# Patient Record
Sex: FEMALE | Race: WHITE | NOT HISPANIC OR LATINO | Employment: FULL TIME | URBAN - METROPOLITAN AREA
[De-identification: names, ages, dates, MRNs, and addresses within clinical notes are randomized per-mention and may not be internally consistent; named-entity substitution may affect disease eponyms.]

---

## 2024-06-03 ENCOUNTER — APPOINTMENT (OUTPATIENT)
Dept: GENERAL RADIOLOGY | Facility: HOSPITAL | Age: 19
DRG: 603 | End: 2024-06-03
Payer: OTHER GOVERNMENT

## 2024-06-03 ENCOUNTER — HOSPITAL ENCOUNTER (INPATIENT)
Facility: HOSPITAL | Age: 19
LOS: 1 days | Discharge: HOME | DRG: 603 | End: 2024-06-04
Attending: EMERGENCY MEDICINE | Admitting: INTERNAL MEDICINE
Payer: OTHER GOVERNMENT

## 2024-06-03 DIAGNOSIS — L03.116 CELLULITIS OF LEFT LOWER EXTREMITY: Primary | ICD-10-CM

## 2024-06-03 PROBLEM — L03.90 CELLULITIS: Status: ACTIVE | Noted: 2024-06-03

## 2024-06-03 LAB
ALBUMIN SERPL-MCNC: 4 G/DL (ref 3.5–5.2)
ALBUMIN/GLOB SERPL: 1.3 G/DL
ALP SERPL-CCNC: 82 U/L (ref 39–117)
ALT SERPL W P-5'-P-CCNC: 17 U/L (ref 1–33)
ANION GAP SERPL CALCULATED.3IONS-SCNC: 11.7 MMOL/L (ref 5–15)
AST SERPL-CCNC: 30 U/L (ref 1–32)
BASOPHILS # BLD AUTO: 0.04 10*3/MM3 (ref 0–0.2)
BASOPHILS NFR BLD AUTO: 0.8 % (ref 0–1.5)
BILIRUB SERPL-MCNC: 0.2 MG/DL (ref 0–1.2)
BUN SERPL-MCNC: 8 MG/DL (ref 6–20)
BUN/CREAT SERPL: 10.8 (ref 7–25)
CALCIUM SPEC-SCNC: 9.2 MG/DL (ref 8.6–10.5)
CHLORIDE SERPL-SCNC: 106 MMOL/L (ref 98–107)
CO2 SERPL-SCNC: 20.3 MMOL/L (ref 22–29)
CREAT SERPL-MCNC: 0.74 MG/DL (ref 0.57–1)
D-LACTATE SERPL-SCNC: 0.5 MMOL/L (ref 0.5–2)
D-LACTATE SERPL-SCNC: 3 MMOL/L (ref 0.5–2)
DEPRECATED RDW RBC AUTO: 41.3 FL (ref 37–54)
EGFRCR SERPLBLD CKD-EPI 2021: 119 ML/MIN/1.73
EOSINOPHIL # BLD AUTO: 0.24 10*3/MM3 (ref 0–0.4)
EOSINOPHIL NFR BLD AUTO: 4.7 % (ref 0.3–6.2)
ERYTHROCYTE [DISTWIDTH] IN BLOOD BY AUTOMATED COUNT: 13.1 % (ref 12.3–15.4)
GLOBULIN UR ELPH-MCNC: 3.1 GM/DL
GLUCOSE SERPL-MCNC: 101 MG/DL (ref 65–99)
HCT VFR BLD AUTO: 39.7 % (ref 34–46.6)
HGB BLD-MCNC: 12.8 G/DL (ref 12–15.9)
HOLD SPECIMEN: NORMAL
HOLD SPECIMEN: NORMAL
IMM GRANULOCYTES # BLD AUTO: 0.01 10*3/MM3 (ref 0–0.05)
IMM GRANULOCYTES NFR BLD AUTO: 0.2 % (ref 0–0.5)
LYMPHOCYTES # BLD AUTO: 2.04 10*3/MM3 (ref 0.7–3.1)
LYMPHOCYTES NFR BLD AUTO: 40.3 % (ref 19.6–45.3)
MCH RBC QN AUTO: 27.8 PG (ref 26.6–33)
MCHC RBC AUTO-ENTMCNC: 32.2 G/DL (ref 31.5–35.7)
MCV RBC AUTO: 86.3 FL (ref 79–97)
MONOCYTES # BLD AUTO: 0.66 10*3/MM3 (ref 0.1–0.9)
MONOCYTES NFR BLD AUTO: 13 % (ref 5–12)
MRSA DNA SPEC QL NAA+PROBE: NORMAL
NEUTROPHILS NFR BLD AUTO: 2.07 10*3/MM3 (ref 1.7–7)
NEUTROPHILS NFR BLD AUTO: 41 % (ref 42.7–76)
NRBC BLD AUTO-RTO: 0 /100 WBC (ref 0–0.2)
PLATELET # BLD AUTO: 213 10*3/MM3 (ref 140–450)
PMV BLD AUTO: 10.3 FL (ref 6–12)
POTASSIUM SERPL-SCNC: 4 MMOL/L (ref 3.5–5.2)
PROT SERPL-MCNC: 7.1 G/DL (ref 6–8.5)
RBC # BLD AUTO: 4.6 10*6/MM3 (ref 3.77–5.28)
SODIUM SERPL-SCNC: 138 MMOL/L (ref 136–145)
WBC NRBC COR # BLD AUTO: 5.06 10*3/MM3 (ref 3.4–10.8)
WHOLE BLOOD HOLD COAG: NORMAL
WHOLE BLOOD HOLD SPECIMEN: NORMAL

## 2024-06-03 PROCEDURE — 83605 ASSAY OF LACTIC ACID: CPT | Performed by: EMERGENCY MEDICINE

## 2024-06-03 PROCEDURE — 99285 EMERGENCY DEPT VISIT HI MDM: CPT

## 2024-06-03 PROCEDURE — G0378 HOSPITAL OBSERVATION PER HR: HCPCS

## 2024-06-03 PROCEDURE — 99222 1ST HOSP IP/OBS MODERATE 55: CPT | Performed by: INTERNAL MEDICINE

## 2024-06-03 PROCEDURE — 87641 MR-STAPH DNA AMP PROBE: CPT | Performed by: INTERNAL MEDICINE

## 2024-06-03 PROCEDURE — 25010000002 ENOXAPARIN PER 10 MG: Performed by: INTERNAL MEDICINE

## 2024-06-03 PROCEDURE — 25810000003 SODIUM CHLORIDE 0.9 % SOLUTION: Performed by: INTERNAL MEDICINE

## 2024-06-03 PROCEDURE — 80053 COMPREHEN METABOLIC PANEL: CPT | Performed by: EMERGENCY MEDICINE

## 2024-06-03 PROCEDURE — 36415 COLL VENOUS BLD VENIPUNCTURE: CPT | Performed by: EMERGENCY MEDICINE

## 2024-06-03 PROCEDURE — 85025 COMPLETE CBC W/AUTO DIFF WBC: CPT | Performed by: EMERGENCY MEDICINE

## 2024-06-03 PROCEDURE — 87040 BLOOD CULTURE FOR BACTERIA: CPT | Performed by: EMERGENCY MEDICINE

## 2024-06-03 PROCEDURE — 73620 X-RAY EXAM OF FOOT: CPT

## 2024-06-03 PROCEDURE — 25010000002 VANCOMYCIN 5 G RECONSTITUTED SOLUTION: Performed by: INTERNAL MEDICINE

## 2024-06-03 RX ORDER — AMOXICILLIN 250 MG
2 CAPSULE ORAL 2 TIMES DAILY PRN
Status: DISCONTINUED | OUTPATIENT
Start: 2024-06-03 | End: 2024-06-04 | Stop reason: HOSPADM

## 2024-06-03 RX ORDER — ACETAMINOPHEN 160 MG/5ML
650 SOLUTION ORAL EVERY 4 HOURS PRN
Status: DISCONTINUED | OUTPATIENT
Start: 2024-06-03 | End: 2024-06-04 | Stop reason: HOSPADM

## 2024-06-03 RX ORDER — SODIUM CHLORIDE 0.9 % (FLUSH) 0.9 %
10 SYRINGE (ML) INJECTION EVERY 12 HOURS SCHEDULED
Status: DISCONTINUED | OUTPATIENT
Start: 2024-06-03 | End: 2024-06-04 | Stop reason: HOSPADM

## 2024-06-03 RX ORDER — SODIUM CHLORIDE 9 MG/ML
40 INJECTION, SOLUTION INTRAVENOUS AS NEEDED
Status: DISCONTINUED | OUTPATIENT
Start: 2024-06-03 | End: 2024-06-04 | Stop reason: HOSPADM

## 2024-06-03 RX ORDER — POLYETHYLENE GLYCOL 3350 17 G/17G
17 POWDER, FOR SOLUTION ORAL DAILY PRN
Status: DISCONTINUED | OUTPATIENT
Start: 2024-06-03 | End: 2024-06-04 | Stop reason: HOSPADM

## 2024-06-03 RX ORDER — ENOXAPARIN SODIUM 100 MG/ML
40 INJECTION SUBCUTANEOUS DAILY
Status: DISCONTINUED | OUTPATIENT
Start: 2024-06-03 | End: 2024-06-04 | Stop reason: HOSPADM

## 2024-06-03 RX ORDER — ACETAMINOPHEN 650 MG/1
650 SUPPOSITORY RECTAL EVERY 4 HOURS PRN
Status: DISCONTINUED | OUTPATIENT
Start: 2024-06-03 | End: 2024-06-04 | Stop reason: HOSPADM

## 2024-06-03 RX ORDER — ACETAMINOPHEN 325 MG/1
650 TABLET ORAL EVERY 4 HOURS PRN
Status: DISCONTINUED | OUTPATIENT
Start: 2024-06-03 | End: 2024-06-04 | Stop reason: HOSPADM

## 2024-06-03 RX ORDER — ONDANSETRON 2 MG/ML
4 INJECTION INTRAMUSCULAR; INTRAVENOUS EVERY 6 HOURS PRN
Status: DISCONTINUED | OUTPATIENT
Start: 2024-06-03 | End: 2024-06-04 | Stop reason: HOSPADM

## 2024-06-03 RX ORDER — SODIUM CHLORIDE 0.9 % (FLUSH) 0.9 %
10 SYRINGE (ML) INJECTION AS NEEDED
Status: DISCONTINUED | OUTPATIENT
Start: 2024-06-03 | End: 2024-06-04 | Stop reason: HOSPADM

## 2024-06-03 RX ORDER — BISACODYL 5 MG/1
5 TABLET, DELAYED RELEASE ORAL DAILY PRN
Status: DISCONTINUED | OUTPATIENT
Start: 2024-06-03 | End: 2024-06-04 | Stop reason: HOSPADM

## 2024-06-03 RX ORDER — TRAMADOL HYDROCHLORIDE 50 MG/1
50 TABLET ORAL EVERY 6 HOURS PRN
Status: DISCONTINUED | OUTPATIENT
Start: 2024-06-03 | End: 2024-06-04 | Stop reason: HOSPADM

## 2024-06-03 RX ORDER — BISACODYL 10 MG
10 SUPPOSITORY, RECTAL RECTAL DAILY PRN
Status: DISCONTINUED | OUTPATIENT
Start: 2024-06-03 | End: 2024-06-04 | Stop reason: HOSPADM

## 2024-06-03 RX ORDER — CLINDAMYCIN HYDROCHLORIDE 300 MG/1
300 CAPSULE ORAL 3 TIMES DAILY
COMMUNITY
End: 2024-06-04 | Stop reason: HOSPADM

## 2024-06-03 RX ADMIN — VANCOMYCIN HYDROCHLORIDE 1250 MG: 5 INJECTION, POWDER, LYOPHILIZED, FOR SOLUTION INTRAVENOUS at 19:34

## 2024-06-03 RX ADMIN — Medication 10 ML: at 21:11

## 2024-06-03 RX ADMIN — ENOXAPARIN SODIUM 40 MG: 100 INJECTION SUBCUTANEOUS at 19:37

## 2024-06-03 NOTE — H&P
[facility]  Date: 6/3/2024   Patient Name: Michelle Loving  : 2005  MRN: [id number]  Primary Care Physician:  Provider, No Known  Date of admission: 6/3/2024    Subjective   Subjective     Chief Complaint: Left foot swelling and erythema    HPI:    Michelle Loving is a 18 y.o. female no past medical history that presents with left foot swelling and erythema x 4 days.  She was seen in outpatient setting 2 days ago and prescribed clindamycin but symptoms have progressed since that time therefore brought to the ER for further evaluation.  Denies any other fevers, chills, sweats, nausea, vomiting, chest pain, shortness of breath, palpitations, abdominal pain diarrhea constipation dysuria, weakness.  This patient has failed outpatient therapy with clindamycin she has been started on vancomycin and will be admitted for ongoing monitoring and management.      Personal History     Past Medical History:  Denies    Past Surgical History:  Denies    Family History:   Denies any immediate family history of heart disease, cancer    Social History:   No alcohol tobacco or illicit drug use, independent ADLs    Home Medications:  clindamycin    Allergies:  Allergies   Allergen Reactions    Amoxicillin Hives       Review of Systems   All systems were reviewed and negative except for: Left foot swelling and erythema and pain    Objective   Objective     Vitals:   Temp:  [98.8 °F (37.1 °C)] 98.8 °F (37.1 °C)  Heart Rate:  [] 73  Resp:  [18] 18  BP: (107-137)/(66-85) 111/72    Physical Exam    Constitutional: Awake, alert, no acute distress   Eyes: Pupils equal, sclerae anicteric, no conjunctival injection   HENT: NCAT, mucous membranes moist   Neck: Supple, no thyromegaly, no lymphadenopathy, trachea midline   Respiratory: Clear to auscultation bilaterally, nonlabored respirations    Cardiovascular: RRR, no murmurs, rubs, or gallops, palpable pedal pulses  bilaterally   Gastrointestinal: Positive bowel sounds, soft, nontender, nondistended   Musculoskeletal: No bilateral ankle edema, no clubbing or cyanosis to extremities   Psychiatric: Appropriate affect, cooperative   Neurologic: Oriented x 3, strength symmetric in all extremities, Cranial Nerves grossly intact to confrontation, speech clear   Skin: Left foot swelling and erythema    Result Review    Result Review:  I have personally reviewed the results from the time of this admission to 6/3/2024 17:18 EDT and agree with these findings:  [x]  Laboratory  []  Microbiology  [x]  Radiology  []  EKG/Telemetry   []  Cardiology/Vascular   []  Pathology  []  Old records  []  Other:      Assessment & Plan   Assessment / Plan     Assessment/Plan:   Left foot cellulitis: Failed outpatient therapy with clindamycin.  Will start vancomycin and monitor for improvement.  Can likely DC on Keflex or Bactrim once improving.  Serial exams.  Follow blood cultures.      DVT prophylaxis:  Medical DVT prophylaxis orders are signed and held.          CODE STATUS:     Full code    Admission Status:  I believe this patient meets observation status.    Electronically signed by Bi Reid Jr, MD, 06/03/24, 5:18 PM EDT.

## 2024-06-03 NOTE — ED TRIAGE NOTES
LLE pain/discoloration/swelling x 4 days.  Pt went to UC on Saturday for redness and dx with celulitis and given clindamycin with no improvement. Pt was waiting [place] Wednesday but reports that she doesn't think there was an injury to the area.

## 2024-06-03 NOTE — PROGRESS NOTES
"[facility] Clinical Pharmacy Services: Vancomycin Pharmacokinetic Initial Consult Note    Michelle Loving is a 18 y.o. female who is on day 1 of pharmacy to dose vancomycin for Skin and Soft Tissue.    Consult Information  Consulting Provider: PAWAN Reid  Planned Duration of Therapy: 10 days  Was Patient Receiving Prior to Admission/Consult?: No  Loading Dose Ordered or Given: None  PK/PD Target: -600 mg/L.hr   Relevant ID History: Failed outpatient clindamycin per H&P  Other Antimicrobials: None    Imaging Reviewed?: Yes    Microbiology Data  MRSA PCR performed: 24; Result: Pending  Culture/Source:   6/3: Blood - Pending   6/3: Blood - Pending    Vitals/Labs  Ht: 160 cm (63\"); Wt: 68.1 kg (150 lb 2.1 oz)  Temp (24hrs), Av.5 °F (36.9 °C), Min:97.8 °F (36.6 °C), Max:98.8 °F (37.1 °C)   Estimated Creatinine Clearance: 112.4 mL/min (by C-G formula based on SCr of 0.74 mg/dL).       Results from last 7 days   Lab Units 24  1216   CREATININE mg/dL 0.74   WBC 10*3/mm3 5.06     Assessment/Plan:    Vancomycin Dose:  1250 mg IV every 12 hours; which provides the following predicted parameters:  AUC24,ss: 515 mg/L.hr  PAUC*: 75 %  Ctrough,ss: 14.6 mg/L  Pconc*: 27 %  Tox.: 10 %  Vanc Random planned for  at 0600  Patient has order for Basic Metabolic Panel    Pharmacy will follow patient's kidney function and will adjust doses and obtain levels as necessary. Thank you for involving pharmacy in this patient's care. Please contact pharmacy with any questions or concerns.                           Maribell Patino  Clinical Pharmacist    "

## 2024-06-03 NOTE — ED PROVIDER NOTES
"Time: 2:56 PM EDT  Date of encounter:  6/3/2024  Independent Historian/Clinical History and Information was obtained by:   Patient    History is limited by: N/A    Chief Complaint: Left foot infection      History of Present Illness:  Patient is a 18 y.o. year old female who presents to the emergency department for evaluation of left foot infection.  Patient is had cellulitis of the left foot toes and has been on clindamycin and since Saturday after being seen in urgent care.  Patient states redness is spreading.    HPI    Patient Care Team  Primary Care Provider: Provider, No Known    Past Medical History:     Allergies   Allergen Reactions    Amoxicillin Hives     Past Medical History:   Diagnosis Date    Staph infection      History reviewed. No pertinent surgical history.  History reviewed. No pertinent family history.    Home Medications:  Prior to Admission medications    Medication Sig Start Date End Date Taking? Authorizing Provider   clindamycin (CLEOCIN) 300 MG capsule Take 1 capsule by mouth 3 (Three) Times a Day.    Provider, Historical, MD        Social History:   Social History     Tobacco Use    Smoking status: Never     Passive exposure: Never    Smokeless tobacco: Never   Vaping Use    Vaping status: Never Used   Substance Use Topics    Alcohol use: Never    Drug use: Never         Review of Systems:  Review of Systems   Skin:  Positive for color change.        Physical Exam:  /67 (BP Location: Left arm, Patient Position: Lying)   Pulse 75   Temp 97.5 °F (36.4 °C) (Oral)   Resp 18   Ht 160 cm (63\")   Wt 71 kg (156 lb 8.4 oz)   SpO2 100%   BMI 27.73 kg/m²     Physical Exam  Vitals and nursing note reviewed.   Constitutional:       General: She is not in acute distress.  Eyes:      Extraocular Movements: Extraocular movements intact.   Cardiovascular:      Rate and Rhythm: Normal rate.   Pulmonary:      Effort: Pulmonary effort is normal. No respiratory distress.   Musculoskeletal:        "  General: Normal range of motion.   Skin:     General: Skin is warm and dry.      Findings: Erythema present.      Comments: Patient has erythema, swelling and tenderness with warmth to the left toes and distal forefoot consistent with cellulitis.   Neurological:      Mental Status: She is alert and oriented to person, place, and time.                  Procedures:  Procedures      Medical Decision Making:      Comorbidities that affect care:    None    External Notes reviewed:  None available        The following orders were placed and all results were independently analyzed by me:  Orders Placed This Encounter   Procedures    Blood Culture - Blood,    Blood Culture - Blood,    MRSA Screen, PCR (Inpatient) - Swab, Nares    XR Foot 2 View Left    Comprehensive Metabolic Panel    Lactic Acid, Plasma    [place] Draw    CBC Auto Differential    STAT Lactic Acid, Reflex    CBC (No Diff)    Comprehensive Metabolic Panel    Initiate Observation Status    Inpatient Admission    Discharge patient    CBC & Differential    Green Top (Gel)    Lavender Top    Gold Top - SST    Light Blue Top       Medications Given in the Emergency Department:  Medications - No data to display     ED Course:         Labs:    Lab Results (last 24 hours)       ** No results found for the last 24 hours. **             Imaging:    No Radiology Exams Resulted Within Past 24 Hours      Differential Diagnosis and Discussion:    Extremity Pain: Differential diagnosis includes but is not limited to soft tissue sprain, tendonitis, tendon injury, dislocation, fracture, deep vein thrombosis, arterial insufficiency, osteoarthritis, bursitis, and ligamentous damage.    All labs were reviewed and interpreted by me.  All X-rays impressions were independently interpreted by me.    MDM  The patient has erythema and pain consistent with cellulitis. The area of erythema was demarcated in the ED. The patient has no fluctuance or induration consistent with abscess  formation. The patient was started on antibiotics in the Emergency Department. The patient is resting comfortably and feels better, is alert and in no distress. On re-examination the patient does not appear toxic and has no meningeal signs, and there's no intractable vomiting or respiratory distress. Based on the history, exam, diagnostic testing and reassessment, the patient has no signs of sepsis.  The patient's vital signs have been stable. The patient's condition is stable and is appropriate for discharge. The patient will pursue further outpatient evaluation with the primary care physician or other designated or consultant physician as indicated in the discharge instructions. The patient was counseled that a repeat examination within two days is imperative. This can be done as an outpatient. Patient advised to return to the ED if outpatient evaluation is not feasible. The patient was counseled to return to the ER sooner for worsening of erythema or spread outside of the demarcated lines. The patient was also counseled to return for worsening fever, chills, altered mental status, intractable vomiting or any other symptoms of concern.        Patient Care Considerations:          Consultants/Shared Management Plan:    None    Social Determinants of Health:    Patient is independent, reliable, and has access to care.       Disposition and Care Coordination:    Discharged: The patient is suitable and stable for discharge with no need for consideration of admission.    I have explained the patient´s condition, diagnoses and treatment plan based on the information available to me at this time. I have answered questions and addressed any concerns. The patient has a good  understanding of the patient´s diagnosis, condition, and treatment plan as can be expected at this point. The vital signs have been stable. The patient´s condition is stable and appropriate for discharge from the emergency department.      The patient  will pursue further outpatient evaluation with the primary care physician or other designated or consulting physician as outlined in the discharge instructions. They are agreeable to this plan of care and follow-up instructions have been explained in detail. The patient has received these instructions in written format and has expressed an understanding of the discharge instructions. The patient is aware that any significant change in condition or worsening of symptoms should prompt an immediate return to this or the closest emergency department or call to 911.  I have explained discharge medications and the need for follow up with the patient/caretakers. This was also printed in the discharge instructions. Patient was discharged with the following medications and follow up:      Medication List        New Prescriptions      cephalexin 500 MG capsule  Commonly known as: KEFLEX  Take 1 capsule by mouth Every 6 (Six) Hours for 20 doses. Indications: Infection of the Skin and/or Soft Tissue     doxycycline 100 MG capsule  Commonly known as: MONODOX  Take 1 capsule by mouth Every 12 (Twelve) Hours for 10 doses. Indications: Infection of the Skin and/or Soft Tissue            Stop      clindamycin 300 MG capsule  Commonly known as: CLEOCIN               Where to Get Your Medications        These medications were sent to [facility] [id number] - [place], [place] - [address] [facility] - [phone]  - [phone]  [address] [facility] [address]      Phone: [phone]   cephalexin 500 MG capsule  doxycycline 100 MG capsule      Provider, No Known  [facility]  [place] [address]      Please make a follow up appointment wiht your primary care provider 3-5 days.      Final diagnoses:   Cellulitis of left lower extremity        ED Disposition       ED Disposition   Decision to Admit    Condition   --    Comment   Level of Care: Med/Surg [1]   Diagnosis: Cellulitis [[id number]]                  This medical record created using voice recognition software.             Eldon Adkins DO  06/08/24 2102

## 2024-06-04 ENCOUNTER — READMISSION MANAGEMENT (OUTPATIENT)
Dept: CALL CENTER | Facility: HOSPITAL | Age: 19
End: 2024-06-04
Payer: OTHER GOVERNMENT

## 2024-06-04 VITALS
WEIGHT: 156.53 LBS | DIASTOLIC BLOOD PRESSURE: 67 MMHG | HEART RATE: 75 BPM | HEIGHT: 63 IN | TEMPERATURE: 97.5 F | RESPIRATION RATE: 18 BRPM | OXYGEN SATURATION: 100 % | BODY MASS INDEX: 27.73 KG/M2 | SYSTOLIC BLOOD PRESSURE: 116 MMHG

## 2024-06-04 LAB
ALBUMIN SERPL-MCNC: 3.7 G/DL (ref 3.5–5.2)
ALBUMIN/GLOB SERPL: 1.3 G/DL
ALP SERPL-CCNC: 72 U/L (ref 39–117)
ALT SERPL W P-5'-P-CCNC: 14 U/L (ref 1–33)
ANION GAP SERPL CALCULATED.3IONS-SCNC: 10.4 MMOL/L (ref 5–15)
AST SERPL-CCNC: 22 U/L (ref 1–32)
BILIRUB SERPL-MCNC: <0.2 MG/DL (ref 0–1.2)
BUN SERPL-MCNC: 11 MG/DL (ref 6–20)
BUN/CREAT SERPL: 16.2 (ref 7–25)
CALCIUM SPEC-SCNC: 8.9 MG/DL (ref 8.6–10.5)
CHLORIDE SERPL-SCNC: 104 MMOL/L (ref 98–107)
CO2 SERPL-SCNC: 24.6 MMOL/L (ref 22–29)
CREAT SERPL-MCNC: 0.68 MG/DL (ref 0.57–1)
DEPRECATED RDW RBC AUTO: 40.4 FL (ref 37–54)
EGFRCR SERPLBLD CKD-EPI 2021: 128 ML/MIN/1.73
ERYTHROCYTE [DISTWIDTH] IN BLOOD BY AUTOMATED COUNT: 13.1 % (ref 12.3–15.4)
GLOBULIN UR ELPH-MCNC: 2.8 GM/DL
GLUCOSE SERPL-MCNC: 107 MG/DL (ref 65–99)
HCT VFR BLD AUTO: 37.7 % (ref 34–46.6)
HGB BLD-MCNC: 12.4 G/DL (ref 12–15.9)
MCH RBC QN AUTO: 28 PG (ref 26.6–33)
MCHC RBC AUTO-ENTMCNC: 32.9 G/DL (ref 31.5–35.7)
MCV RBC AUTO: 85.1 FL (ref 79–97)
PLATELET # BLD AUTO: 217 10*3/MM3 (ref 140–450)
PMV BLD AUTO: 10.7 FL (ref 6–12)
POTASSIUM SERPL-SCNC: 4.3 MMOL/L (ref 3.5–5.2)
PROT SERPL-MCNC: 6.5 G/DL (ref 6–8.5)
RBC # BLD AUTO: 4.43 10*6/MM3 (ref 3.77–5.28)
SODIUM SERPL-SCNC: 139 MMOL/L (ref 136–145)
WBC NRBC COR # BLD AUTO: 7.75 10*3/MM3 (ref 3.4–10.8)

## 2024-06-04 PROCEDURE — 80053 COMPREHEN METABOLIC PANEL: CPT | Performed by: INTERNAL MEDICINE

## 2024-06-04 PROCEDURE — 25010000002 VANCOMYCIN 5 G RECONSTITUTED SOLUTION: Performed by: STUDENT IN AN ORGANIZED HEALTH CARE EDUCATION/TRAINING PROGRAM

## 2024-06-04 PROCEDURE — 85027 COMPLETE CBC AUTOMATED: CPT | Performed by: INTERNAL MEDICINE

## 2024-06-04 PROCEDURE — 25010000002 ENOXAPARIN PER 10 MG: Performed by: INTERNAL MEDICINE

## 2024-06-04 PROCEDURE — 99239 HOSP IP/OBS DSCHRG MGMT >30: CPT | Performed by: STUDENT IN AN ORGANIZED HEALTH CARE EDUCATION/TRAINING PROGRAM

## 2024-06-04 PROCEDURE — 25810000003 SODIUM CHLORIDE 0.9 % SOLUTION: Performed by: STUDENT IN AN ORGANIZED HEALTH CARE EDUCATION/TRAINING PROGRAM

## 2024-06-04 RX ORDER — DOXYCYCLINE 100 MG/1
100 CAPSULE ORAL EVERY 12 HOURS SCHEDULED
Status: DISCONTINUED | OUTPATIENT
Start: 2024-06-04 | End: 2024-06-04 | Stop reason: HOSPADM

## 2024-06-04 RX ORDER — CEPHALEXIN 500 MG/1
500 CAPSULE ORAL EVERY 6 HOURS SCHEDULED
Qty: 20 CAPSULE | Refills: 0 | Status: SHIPPED | OUTPATIENT
Start: 2024-06-04 | End: 2024-06-09

## 2024-06-04 RX ORDER — CEPHALEXIN 500 MG/1
500 CAPSULE ORAL EVERY 6 HOURS SCHEDULED
Status: DISCONTINUED | OUTPATIENT
Start: 2024-06-04 | End: 2024-06-04 | Stop reason: HOSPADM

## 2024-06-04 RX ORDER — DOXYCYCLINE 100 MG/1
100 CAPSULE ORAL EVERY 12 HOURS SCHEDULED
Qty: 10 CAPSULE | Refills: 0 | Status: SHIPPED | OUTPATIENT
Start: 2024-06-04 | End: 2024-06-09

## 2024-06-04 RX ADMIN — CEPHALEXIN 500 MG: 500 CAPSULE ORAL at 14:29

## 2024-06-04 RX ADMIN — DOXYCYCLINE 100 MG: 100 CAPSULE ORAL at 14:29

## 2024-06-04 RX ADMIN — VANCOMYCIN HYDROCHLORIDE 1250 MG: 5 INJECTION, POWDER, LYOPHILIZED, FOR SOLUTION INTRAVENOUS at 10:04

## 2024-06-04 RX ADMIN — Medication 10 ML: at 10:04

## 2024-06-04 RX ADMIN — ENOXAPARIN SODIUM 40 MG: 100 INJECTION SUBCUTANEOUS at 10:04

## 2024-06-04 NOTE — PAYOR COMM NOTE
"Shakira Loving (18 y.o. Female)       Date of Birth   2005    Social Security Number       Address   [address] [place] [address]    Home Phone   [phone]    MRN   [id number]       Congregational   None    Marital Status   Single                            Admission Date   6/3/24    Admission Type   Emergency    Admitting Provider   Bi Reid Jr., MD    Attending Provider   Sindi Hernandez DO    Department, Room/Bed   [address] AMBULATORY SERVICES, 366/       Discharge Date       Discharge Disposition       Discharge Destination                                 Attending Provider: Sindi Hernandez DO    Allergies: Amoxicillin    Isolation: None   Infection: None   Code Status: CPR    Ht: 160 cm (63\")   Wt: 71 kg (156 lb 8.4 oz)    Admission Cmt: None   Principal Problem: Cellulitis [L03.90]                   Active Insurance as of 6/3/2024       Primary Coverage       Payor Plan Insurance Group Employer/Plan Group    [facility]        Payor Plan Address Payor Plan Phone Number Payor Plan Fax Number Effective Dates    [address] [phone]  6/3/2024 - None Entered    [facility] [id number]         Subscriber Name Subscriber Birth Date Member ID       SHAKIRA LOVING 2005 [id number]                     Emergency Contacts            No emergency contacts on file.                 History & Physical        Bi Reid Jr., MD at [address] [place] [address] HISTORY AND PHYSICAL  Date: 6/3/2024   Patient Name: Shakira Loving  : 2005  MRN: [id number]  Primary Care Physician:  Provider, No Known  Date of admission: 6/3/2024    Subjective  Subjective     Chief Complaint: Left foot swelling and erythema    HPI:    Shakira Loving is a 18 y.o. female no past medical history that presents with left foot swelling and erythema x 4 days.  She was seen in outpatient setting 2 days ago and prescribed " clindamycin but symptoms have progressed since that time therefore brought to the ER for further evaluation.  Denies any other fevers, chills, sweats, nausea, vomiting, chest pain, shortness of breath, palpitations, abdominal pain diarrhea constipation dysuria, weakness.  This patient has failed outpatient therapy with clindamycin she has been started on vancomycin and will be admitted for ongoing monitoring and management.      Personal History     Past Medical History:  Denies    Past Surgical History:  Denies    Family History:   Denies any immediate family history of heart disease, cancer    Social History:   No alcohol tobacco or illicit drug use, independent ADLs    Home Medications:  clindamycin    Allergies:  Allergies   Allergen Reactions    Amoxicillin Hives       Review of Systems   All systems were reviewed and negative except for: Left foot swelling and erythema and pain    Objective  Objective     Vitals:   Temp:  [98.8 °F (37.1 °C)] 98.8 °F (37.1 °C)  Heart Rate:  [] 73  Resp:  [18] 18  BP: (107-137)/(66-85) 111/72    Physical Exam    Constitutional: Awake, alert, no acute distress   Eyes: Pupils equal, sclerae anicteric, no conjunctival injection   HENT: NCAT, mucous membranes moist   Neck: Supple, no thyromegaly, no lymphadenopathy, trachea midline   Respiratory: Clear to auscultation bilaterally, nonlabored respirations    Cardiovascular: RRR, no murmurs, rubs, or gallops, palpable pedal pulses bilaterally   Gastrointestinal: Positive bowel sounds, soft, nontender, nondistended   Musculoskeletal: No bilateral ankle edema, no clubbing or cyanosis to extremities   Psychiatric: Appropriate affect, cooperative   Neurologic: Oriented x 3, strength symmetric in all extremities, Cranial Nerves grossly intact to confrontation, speech clear   Skin: Left foot swelling and erythema    Result Review   Result Review:  I have personally reviewed the results from the time of this admission to 6/3/2024 17:18  EDT and agree with these findings:  [x]  Laboratory  []  Microbiology  [x]  Radiology  []  EKG/Telemetry   []  Cardiology/Vascular   []  Pathology  []  Old records  []  Other:      Assessment & Plan  Assessment / Plan     Assessment/Plan:   Left foot cellulitis: Failed outpatient therapy with clindamycin.  Will start vancomycin and monitor for improvement.  Can likely DC on Keflex or Bactrim once improving.  Serial exams.  Follow blood cultures.      DVT prophylaxis:  Medical DVT prophylaxis orders are signed and held.          CODE STATUS:     Full code    Admission Status:  I believe this patient meets observation status.    Electronically signed by Bi Reid Jr, MD, 06/03/24, 5:18 PM EDT.             Electronically signed by Bi Reid Jr., MD at 06/03/24 1719          Emergency Department Notes        Bhumi Dotson, RN at 06/03/24 1207          LLE pain/discoloration/swelling x 4 days.  Pt went to  on Saturday for redness and dx with celulitis and given clindamycin with no improvement. Pt was [place] but reports that she doesn't think there was an injury to the area.     Electronically signed by Bhumi Dotson, RN at 06/03/24 1209       Facility-Administered Medications as of 6/4/2024   Medication Dose Route Frequency Provider Last Rate Last Admin    acetaminophen (TYLENOL) tablet 650 mg  650 mg Oral Q4H PRN Bi Reid Jr., MD        Or    acetaminophen (TYLENOL) 160 MG/5ML oral solution 650 mg  650 mg Oral Q4H PRN Bi Reid Jr., MD        Or    acetaminophen (TYLENOL) suppository 650 mg  650 mg Rectal Q4H PRN Bi Reid Jr., MD        sennosides-docusate (PERICOLACE) 8.6-50 MG per tablet 2 tablet  2 tablet Oral BID PRN Bi Reid Jr., MD        And    polyethylene glycol (MIRALAX) packet 17 g  17 g Oral Daily PRN Bi Reid Jr., MD        And    bisacodyl (DULCOLAX) EC tablet 5 mg  5 mg Oral Daily PRN Bi Reid Jr., MD        And    bisacodyl  (DULCOLAX) suppository 10 mg  10 mg Rectal Daily PRN Bi Reid Jr., MD        Enoxaparin Sodium (LOVENOX) syringe 40 mg  40 mg Subcutaneous Daily Bi Reid Jr., MD   40 mg at 06/04/24 1004    ondansetron (ZOFRAN) injection 4 mg  4 mg Intravenous Q6H PRN Bi Reid Jr., MD        Pharmacy to dose vancomycin   Does not apply Continuous PRN Bi Reid Jr., MD        sodium chloride 0.9 % flush 10 mL  10 mL Intravenous Q12H Bi Reid Jr., MD   10 mL at 06/04/24 1004    sodium chloride 0.9 % flush 10 mL  10 mL Intravenous PRN Bi Reid Jr., MD        sodium chloride 0.9 % infusion 40 mL  40 mL Intravenous PRN Bi Reid Jr., MD        traMADol (ULTRAM) tablet 50 mg  50 mg Oral Q6H PRN Bi Reid Jr., MD        vancomycin 1250 mg/250 mL 0.9% NS IVPB ([facility])  1,250 mg Intravenous Q12H Sindi Hernandez DO   1,250 mg at 06/04/24 1004       Lab Results (last 24 hours)       Procedure Component Value Units Date/Time    Comprehensive Metabolic Panel [[id number]]  (Abnormal) Collected: 06/04/24 0518    Specimen: Blood Updated: 06/04/24 0621     Glucose 107 mg/dL      BUN 11 mg/dL      Creatinine 0.68 mg/dL      Sodium 139 mmol/L      Potassium 4.3 mmol/L      Chloride 104 mmol/L      CO2 24.6 mmol/L      Calcium 8.9 mg/dL      Total Protein 6.5 g/dL      Albumin 3.7 g/dL      ALT (SGPT) 14 U/L      AST (SGOT) 22 U/L      Alkaline Phosphatase 72 U/L      Total Bilirubin <0.2 mg/dL      Globulin 2.8 gm/dL      A/G Ratio 1.3 g/dL      BUN/Creatinine Ratio 16.2     Anion Gap 10.4 mmol/L      eGFR 128.0 mL/min/1.73     Narrative:      GFR Normal >60  Chronic Kidney Disease <60  Kidney Failure <15      CBC (No Diff) [[id number]]  (Normal) Collected: 06/04/24 0518    Specimen: Blood Updated: 06/04/24 0559     WBC 7.75 10*3/mm3      RBC 4.43 10*6/mm3      Hemoglobin 12.4 g/dL      Hematocrit 37.7 %      MCV 85.1 fL      MCH 28.0 pg      MCHC 32.9 g/dL      RDW 13.1 %      RDW-SD 40.4 fl       MPV 10.7 fL      Platelets 217 10*3/mm3     MRSA Screen, PCR (Inpatient) - Swab, Nares [[id number]]  (Normal) Collected: 06/03/24 1917    Specimen: Swab from Nares Updated: 06/03/24 2051     MRSA PCR No MRSA Detected    Narrative:      The negative predictive value of this diagnostic test is high and should only be used to consider de-escalating anti-MRSA therapy. A positive result may indicate colonization with MRSA and must be correlated clinically.    STAT Lactic Acid, Reflex [[id number]]  (Normal) Collected: 06/03/24 1511    Specimen: Blood from Arm, Left Updated: 06/03/24 1531     Lactate 0.5 mmol/L     Blood Culture - Blood, Arm, Left [[id number]] Collected: 06/03/24 1305    Specimen: Blood from Arm, Left Updated: 06/03/24 1310    Lactic Acid, Plasma [[id number]]  (Abnormal) Collected: 06/03/24 1216    Specimen: Blood from Arm, Left Updated: 06/03/24 1256     Lactate 3.0 mmol/L     Comprehensive Metabolic Panel [[id number]]  (Abnormal) Collected: 06/03/24 1216    Specimen: Blood from Arm, Left Updated: 06/03/24 1245     Glucose 101 mg/dL      BUN 8 mg/dL      Creatinine 0.74 mg/dL      Sodium 138 mmol/L      Potassium 4.0 mmol/L      Chloride 106 mmol/L      CO2 20.3 mmol/L      Calcium 9.2 mg/dL      Total Protein 7.1 g/dL      Albumin 4.0 g/dL      ALT (SGPT) 17 U/L      AST (SGOT) 30 U/L      Alkaline Phosphatase 82 U/L      Total Bilirubin 0.2 mg/dL      Globulin 3.1 gm/dL      A/G Ratio 1.3 g/dL      BUN/Creatinine Ratio 10.8     Anion Gap 11.7 mmol/L      eGFR 119.0 mL/min/1.73     Narrative:      GFR Normal >60  Chronic Kidney Disease <60  Kidney Failure <15      [place] Draw [[id number]] Collected: 06/03/24 1216    Specimen: Blood from Arm, Left Updated: 06/03/24 1228    Narrative:      The following orders were created for panel order [place] Draw.  Procedure                               Abnormality         Status                     ---------                               -----------         ------                      Green Top (Gel)[[id number]]                                  Final result               Lavender Top[[id number]]                                     Final result               Gold Top - SST[[id number]]                                   Final result               Light Blue Top[[id number]]                                   Final result                 Please view results for these tests on the individual orders.    Light Blue Top [[id number]] Collected: 06/03/24 1216    Specimen: Blood from Arm, Left Updated: 06/03/24 1228     Extra Tube Hold for add-ons.     Comment: Auto resulted       Gold Top - SST [[id number]] Collected: 06/03/24 1216    Specimen: Blood from Arm, Left Updated: 06/03/24 1228     Extra Tube Hold for add-ons.     Comment: Auto resulted.       Green Top (Gel) [[id number]] Collected: 06/03/24 1216    Specimen: Blood from Arm, Left Updated: 06/03/24 1228     Extra Tube Hold for add-ons.     Comment: Auto resulted.       Lavender Top [[id number]] Collected: 06/03/24 1216    Specimen: Blood from Arm, Left Updated: 06/03/24 1228     Extra Tube hold for add-on     Comment: Auto resulted       CBC & Differential [[id number]]  (Abnormal) Collected: 06/03/24 1216    Specimen: Blood from Arm, Left Updated: 06/03/24 1225    Narrative:      The following orders were created for panel order CBC & Differential.  Procedure                               Abnormality         Status                     ---------                               -----------         ------                     CBC Auto Differential[[id number]]        Abnormal            Final result                 Please view results for these tests on the individual orders.    CBC Auto Differential [[id number]]  (Abnormal) Collected: 06/03/24 1216    Specimen: Blood from Arm, Left Updated: 06/03/24 1225     WBC 5.06 10*3/mm3      RBC 4.60 10*6/mm3      Hemoglobin 12.8 g/dL      Hematocrit 39.7 %      MCV 86.3 fL      MCH 27.8 pg      MCHC 32.2 g/dL       RDW 13.1 %      RDW-SD 41.3 fl      MPV 10.3 fL      Platelets 213 10*3/mm3      Neutrophil % 41.0 %      Lymphocyte % 40.3 %      Monocyte % 13.0 %      Eosinophil % 4.7 %      Basophil % 0.8 %      Immature Grans % 0.2 %      Neutrophils, Absolute 2.07 10*3/mm3      Lymphocytes, Absolute 2.04 10*3/mm3      Monocytes, Absolute 0.66 10*3/mm3      Eosinophils, Absolute 0.24 10*3/mm3      Basophils, Absolute 0.04 10*3/mm3      Immature Grans, Absolute 0.01 10*3/mm3      nRBC 0.0 /100 WBC     Blood Culture - Blood, Arm, Left [[id number]] Collected: 06/03/24 1216    Specimen: Blood from Arm, Left Updated: 06/03/24 1221          Imaging Results (Last 24 Hours)       Procedure Component Value Units Date/Time    XR Foot 2 View Left [[id number]] Collected: 06/03/24 1622     Updated: 06/03/24 1625    Narrative:      XR FOOT 2 VW LEFT-     Date of Exam: 6/3/2024 4:17 PM     Indication: cellulitis forefoot     Comparison: None available.     Findings:  No acute fracture is identified. No focal osseous abnormality is  identified. The joint spaces are well-maintained. The tarsal bones  appear normal. The soft tissues are unremarkable.       Impression:      Impression:  No acute osseous process identified.        Electronically Signed By-Trey Magallanes MD On:6/3/2024 4:23 PM             Orders (active)        Start     Ordered    06/05/24 0600  Basic Metabolic Panel  Morning Draw         06/04/24 0845    06/05/24 0600  Vancomycin, Random  Morning Draw         06/04/24 0845    06/04/24 0945  vancomycin 1250 mg/250 mL 0.9% NS IVPB ([facility])  Every 12 Hours         06/04/24 0845    06/04/24 0800  Oral Care  2 Times Daily       06/03/24 1836 06/03/24 2100  sodium chloride 0.9 % flush 10 mL  Every 12 Hours Scheduled         06/03/24 1836    06/03/24 2000  Vital Signs  Every 4 Hours       06/03/24 1836    06/03/24 1930  Enoxaparin Sodium (LOVENOX) syringe 40 mg  Daily         06/03/24 1836 06/03/24 1837  Intake & Output  Every  "Shift       06/03/24 1836 06/03/24 1837  Weigh Patient  Once         06/03/24 1836 06/03/24 1837  Insert Peripheral IV  Once         06/03/24 1836 06/03/24 1837  Saline Lock & Maintain IV Access  Continuous         06/03/24 1836 06/03/24 1837  Activity - Ad Jessica  Until Discontinued         06/03/24 1836 06/03/24 1837  Notify Provider (With Default Parameters)  Continuous        Comments: Open Order Report to View Parameters Requiring Provider Notification    06/03/24 1836 06/03/24 1837  Diet: Regular/House; Fluid Consistency: Thin (IDDSI 0)  Diet Effective Now         06/03/24 1836 06/03/24 1836  acetaminophen (TYLENOL) tablet 650 mg  Every 4 Hours PRN        Placed in \"Or\" Linked Group    06/03/24 1836 06/03/24 1836  acetaminophen (TYLENOL) 160 MG/5ML oral solution 650 mg  Every 4 Hours PRN        Placed in \"Or\" Linked Group    06/03/24 1836 06/03/24 1836  acetaminophen (TYLENOL) suppository 650 mg  Every 4 Hours PRN        Placed in \"Or\" Linked Group    06/03/24 1836    06/03/24 1836  traMADol (ULTRAM) tablet 50 mg  Every 6 Hours PRN         06/03/24 1836 06/03/24 1836  sennosides-docusate (PERICOLACE) 8.6-50 MG per tablet 2 tablet  2 Times Daily PRN        Placed in \"And\" Linked Group    06/03/24 1836 06/03/24 1836  polyethylene glycol (MIRALAX) packet 17 g  Daily PRN        Placed in \"And\" Linked Group    06/03/24 1836 06/03/24 1836  bisacodyl (DULCOLAX) EC tablet 5 mg  Daily PRN        Placed in \"And\" Linked Group    06/03/24 1836 06/03/24 1836  bisacodyl (DULCOLAX) suppository 10 mg  Daily PRN        Placed in \"And\" Linked Group    06/03/24 1836 06/03/24 1836  ondansetron (ZOFRAN) injection 4 mg  Every 6 Hours PRN         06/03/24 1836 06/03/24 1836  sodium chloride 0.9 % flush 10 mL  As Needed         06/03/24 1836    06/03/24 1836  sodium chloride 0.9 % infusion 40 mL  As Needed         06/03/24 1836    06/03/24 1836  Pharmacy to dose vancomycin  Continuous PRN     "     06/03/24 1836    06/03/24 1719  Code Status and Medical Interventions:  Continuous         06/03/24 1718    06/03/24 1632  Inpatient Hospitalist Consult  Once        Specialty:  Hospitalist  Provider:  Bi Reid Jr., MD    06/03/24 1631    06/03/24 1211  Blood Culture - Blood, Arm, Left  Once         06/03/24 1211    06/03/24 1211  Blood Culture - Blood, Arm, Left  Once         06/03/24 1211

## 2024-06-04 NOTE — PLAN OF CARE
Goal Outcome Evaluation:  Plan of Care Reviewed With: patient        Progress: improving  Outcome Evaluation: Patient is discharging home with oral antibiotics.

## 2024-06-04 NOTE — OUTREACH NOTE
Prep Survey      Flowsheet Row Responses   [facility] facility patient discharged from? [facility]   Is LACE score < 7 ? Yes   Eligibility Not Eligible   What are the reasons patient is not eligible? Other  [low risk for readmit]   Does the patient have one of the following disease processes/diagnoses(primary or secondary)? Other   Prep survey completed? Yes            MARY WELLS - Registered Nurse

## 2024-06-04 NOTE — DISCHARGE SUMMARY
[facility]         HOSPITALIST  DISCHARGE SUMMARY    Patient Name: Michelle Loving  : 2005  MRN: [id number]    Date of Admission: 6/3/2024  Date of Discharge:  2024      Primary Care Physician: Provider, No Known    Consults       Date and Time Order Name Status Description    6/3/2024  4:31 PM Inpatient Hospitalist Consult              Active and Resolved Hospital Problems:  Active Hospital Problems    Diagnosis POA    **Cellulitis [L03.90] Yes      Resolved Hospital Problems   No resolved problems to display.       Hospital Course     Hospital Course:  Michelle Loving is a 18 y.o. female no past medical history that presents with left foot swelling and erythema x 4 days. She was seen in outpatient setting 2 days ago and prescribed clindamycin but symptoms have progressed since that time therefore brought to the ER for further evaluation.       Patient admitted for cellulitis that has failed outpatient p.o. therapy.  She denies any IV drug abuse.  She denies any known insect or bites.  She was initiated on IV vancomycin with improvement.  Foot x-ray negative.  Patient transition to p.o. doxycycline and cephalexin given amoxicillin allergy.    Patient discharged in stable condition.  Patient was counseled that should her cellulitis began to worsen, she develops fever/chills, she may need to return to the ER for further evaluation.    DISCHARGE Follow Up Recommendations for labs and diagnostics: Follow-up with PCP in 1 to 2-week.      Day of Discharge     Vital Signs:  Temp:  [97.4 °F (36.3 °C)-98.8 °F (37.1 °C)] 97.5 °F (36.4 °C)  Heart Rate:  [56-86] 75  Resp:  [16-18] 18  BP: (102-124)/(56-74) 116/67    Physical Exam:   Gen: NAD, Alert and Oriented  Cards: RRR, no murmur   Pulm: CTA b/l, no wheezing  Abd: soft, nondistended  Extremities: no pitting edema.  Foot edema/erythema, improved.      Discharge Details        Discharge Medications        New Medications        Instructions  Start Date   cephalexin 500 MG capsule  Commonly known as: KEFLEX   500 mg, Oral, Every 6 Hours Scheduled      doxycycline 100 MG capsule  Commonly known as: MONODOX   100 mg, Oral, Every 12 Hours Scheduled             Stop These Medications      clindamycin 300 MG capsule  Commonly known as: CLEOCIN              Allergies   Allergen Reactions    Amoxicillin Hives       Discharge Disposition:  Home or Self Care    Diet:  Hospital:  Diet Order   Procedures    Diet: Regular/House; Fluid Consistency: Thin (IDDSI 0)       Discharge Activity:       CODE STATUS:  Code Status and Medical Interventions:   Ordered at: 06/03/24 7009     Code Status (Patient has no pulse and is not breathing):    CPR (Attempt to Resuscitate)     Medical Interventions (Patient has pulse or is breathing):    Full Support         No future appointments.        Pertinent  and/or Most Recent Results         LAB RESULTS:      Lab 06/04/24  0518 06/03/24  1511 06/03/24  1216   WBC 7.75  --  5.06   HEMOGLOBIN 12.4  --  12.8   HEMATOCRIT 37.7  --  39.7   PLATELETS 217  --  213   NEUTROS ABS  --   --  2.07   IMMATURE GRANS (ABS)  --   --  0.01   LYMPHS ABS  --   --  2.04   MONOS ABS  --   --  0.66   EOS ABS  --   --  0.24   MCV 85.1  --  86.3   LACTATE  --  0.5 3.0*         Lab 06/04/24  0518 06/03/24  1216   SODIUM 139 138   POTASSIUM 4.3 4.0   CHLORIDE 104 106   CO2 24.6 20.3*   ANION GAP 10.4 11.7   BUN 11 8   CREATININE 0.68 0.74   EGFR 128.0 119.0   GLUCOSE 107* 101*   CALCIUM 8.9 9.2         Lab 06/04/24  0518 06/03/24  1216   TOTAL PROTEIN 6.5 7.1   ALBUMIN 3.7 4.0   GLOBULIN 2.8 3.1   ALT (SGPT) 14 17   AST (SGOT) 22 30   BILIRUBIN <0.2 0.2   ALK PHOS 72 82                     Brief Urine Lab Results       None          Microbiology Results (last 10 days)       Procedure Component Value - Date/Time    MRSA Screen, PCR (Inpatient) - Swab, Nares [[id number]]  (Normal) Collected: 06/03/24 1917    Lab Status: Final result Specimen: Swab from Nares  Updated: 06/03/24 2051     MRSA PCR No MRSA Detected    Narrative:      The negative predictive value of this diagnostic test is high and should only be used to consider de-escalating anti-MRSA therapy. A positive result may indicate colonization with MRSA and must be correlated clinically.    Blood Culture - Blood, Arm, Left [[id number]]  (Normal) Collected: 06/03/24 1305    Lab Status: Preliminary result Specimen: Blood from Arm, Left Updated: 06/04/24 1316     Blood Culture No growth at 24 hours    Blood Culture - Blood, Arm, Left [[id number]]  (Normal) Collected: 06/03/24 1216    Lab Status: Preliminary result Specimen: Blood from Arm, Left Updated: 06/04/24 1231     Blood Culture No growth at 24 hours            XR Foot 2 View Left    Result Date: 6/3/2024  Impression: No acute osseous process identified.   Electronically Signed By-Trey Magallanes MD On:6/3/2024 4:23 PM                        Time spent on Discharge including face to face service:  >30 minutes    Electronically signed by Sindi Hernandez DO, 06/04/24, 1:43 PM EDT.

## 2024-06-04 NOTE — PROGRESS NOTES
"[facility] Clinical Pharmacy Services: Vancomycin Pharmacokinetic Initial Consult Note    Michelle Loving is a 18 y.o. female who is on day 2 of pharmacy to dose vancomycin for Skin and Soft Tissue.    Consult Information  Consulting Provider: PAWAN Reid  Planned Duration of Therapy: 10 days  Was Patient Receiving Prior to Admission/Consult?: No  Loading Dose Ordered or Given: None  PK/PD Target: -600 mg/L.hr   Relevant ID History: Failed outpatient clindamycin per H&P  Other Antimicrobials: None    Imaging Reviewed?: Yes    Microbiology Data  MRSA PCR performed: 6/3/24; Result: Negative  Culture/Source:   6/3: Blood - In process  6/3: Blood - In process     Vitals/Labs  Ht: 160 cm (63\"); Wt: 71 kg (156 lb 8.4 oz)  Temp (24hrs), Av.2 °F (36.8 °C), Min:97.4 °F (36.3 °C), Max:98.8 °F (37.1 °C)   Estimated Creatinine Clearance: 124.6 mL/min (by C-G formula based on SCr of 0.68 mg/dL).     Results from last 7 days   Lab Units 24  0518 24  1216   CREATININE mg/dL 0.68 0.74   WBC 10*3/mm3 7.75 5.06     Assessment/Plan:  Re-timed vancomycin ordered to start this morning.    Vancomycin Dose:  1250 mg IV every 12 hours; which provides the following predicted parameters:    Regimen: 1250 mg IV every 12 hours.  Exposure target: AUC24 (range) 400-600 mg/L.hr   AUC24,ss: 469 mg/L.hr  PAUC*: 66 %  Ctrough,ss: 13 mg/L  Pconc*: 21 %  Tox.: 8 %    Vanc Random ordered for  at 0600  Patient has order for Basic Metabolic Panel    Pharmacy will follow patient's kidney function and will adjust doses and obtain levels as necessary. Thank you for involving pharmacy in this patient's care. Please contact pharmacy with any questions or concerns.                           Jose Hudson  Clinical Pharmacist  "

## 2024-06-08 LAB
BACTERIA SPEC AEROBE CULT: NORMAL
BACTERIA SPEC AEROBE CULT: NORMAL